# Patient Record
Sex: MALE | Race: ASIAN | URBAN - METROPOLITAN AREA
[De-identification: names, ages, dates, MRNs, and addresses within clinical notes are randomized per-mention and may not be internally consistent; named-entity substitution may affect disease eponyms.]

---

## 2017-01-01 ENCOUNTER — CARE COORDINATION (OUTPATIENT)
Dept: GERIATRIC MEDICINE | Facility: CLINIC | Age: 82
End: 2017-01-01

## 2017-01-01 ENCOUNTER — TELEPHONE (OUTPATIENT)
Dept: FAMILY MEDICINE | Facility: CLINIC | Age: 82
End: 2017-01-01

## 2017-01-01 ENCOUNTER — TELEPHONE (OUTPATIENT)
Dept: NURSING | Facility: CLINIC | Age: 82
End: 2017-01-01

## 2017-01-01 ENCOUNTER — OFFICE VISIT (OUTPATIENT)
Dept: FAMILY MEDICINE | Facility: CLINIC | Age: 82
End: 2017-01-01
Payer: COMMERCIAL

## 2017-01-01 ENCOUNTER — TRANSFERRED RECORDS (OUTPATIENT)
Dept: HEALTH INFORMATION MANAGEMENT | Facility: CLINIC | Age: 82
End: 2017-01-01

## 2017-01-01 ENCOUNTER — CARE COORDINATION (OUTPATIENT)
Dept: CASE MANAGEMENT | Facility: CLINIC | Age: 82
End: 2017-01-01

## 2017-01-01 VITALS
OXYGEN SATURATION: 100 % | BODY MASS INDEX: 22.52 KG/M2 | DIASTOLIC BLOOD PRESSURE: 72 MMHG | WEIGHT: 131.9 LBS | SYSTOLIC BLOOD PRESSURE: 136 MMHG | HEIGHT: 64 IN | TEMPERATURE: 98 F | HEART RATE: 90 BPM | RESPIRATION RATE: 16 BRPM

## 2017-01-01 DIAGNOSIS — Z91.09 ENVIRONMENTAL ALLERGIES: ICD-10-CM

## 2017-01-01 DIAGNOSIS — F33.0 MAJOR DEPRESSIVE DISORDER, RECURRENT EPISODE, MILD (H): ICD-10-CM

## 2017-01-01 DIAGNOSIS — K59.00 CONSTIPATION, UNSPECIFIED CONSTIPATION TYPE: ICD-10-CM

## 2017-01-01 DIAGNOSIS — Z53.9 DIAGNOSIS NOT YET DEFINED: Primary | ICD-10-CM

## 2017-01-01 DIAGNOSIS — H26.9 CATARACT: ICD-10-CM

## 2017-01-01 DIAGNOSIS — F51.01 PRIMARY INSOMNIA: ICD-10-CM

## 2017-01-01 DIAGNOSIS — N04.0 NEPHROTIC SYNDROME WITH LESION OF MINIMAL CHANGE GLOMERULONEPHRITIS: Primary | ICD-10-CM

## 2017-01-01 DIAGNOSIS — R63.0 ANOREXIA: ICD-10-CM

## 2017-01-01 DIAGNOSIS — E03.9 ACQUIRED HYPOTHYROIDISM: ICD-10-CM

## 2017-01-01 DIAGNOSIS — I10 ESSENTIAL HYPERTENSION WITH GOAL BLOOD PRESSURE LESS THAN 130/80: ICD-10-CM

## 2017-01-01 DIAGNOSIS — N18.4 CKD (CHRONIC KIDNEY DISEASE) STAGE 4, GFR 15-29 ML/MIN (H): Primary | ICD-10-CM

## 2017-01-01 DIAGNOSIS — R63.4 LOSS OF WEIGHT: ICD-10-CM

## 2017-01-01 DIAGNOSIS — M54.50 MIDLINE LOW BACK PAIN WITHOUT SCIATICA, UNSPECIFIED CHRONICITY: ICD-10-CM

## 2017-01-01 DIAGNOSIS — R73.09 ELEVATED GLUCOSE: Primary | ICD-10-CM

## 2017-01-01 DIAGNOSIS — L20.9 ATOPIC DERMATITIS, UNSPECIFIED TYPE: ICD-10-CM

## 2017-01-01 DIAGNOSIS — K21.9 GASTROESOPHAGEAL REFLUX DISEASE, ESOPHAGITIS PRESENCE NOT SPECIFIED: ICD-10-CM

## 2017-01-01 LAB — EJECTION FRACTION: 60

## 2017-01-01 PROCEDURE — G0179 MD RECERTIFICATION HHA PT: HCPCS | Performed by: FAMILY MEDICINE

## 2017-01-01 PROCEDURE — 99214 OFFICE O/P EST MOD 30 MIN: CPT | Performed by: FAMILY MEDICINE

## 2017-01-01 PROCEDURE — 99207 ZZC NO BILLABLE SERVICE THIS VISIT: CPT | Performed by: FAMILY MEDICINE

## 2017-01-01 RX ORDER — METOPROLOL TARTRATE 50 MG
TABLET ORAL
Qty: 90 TABLET | Refills: 3 | OUTPATIENT
Start: 2017-01-01

## 2017-01-01 RX ORDER — ACETAMINOPHEN AND CODEINE PHOSPHATE 300; 30 MG/1; MG/1
1 TABLET ORAL 3 TIMES DAILY PRN
Qty: 90 TABLET | Refills: 2 | Status: SHIPPED | OUTPATIENT
Start: 2017-01-01

## 2017-01-01 RX ORDER — QUETIAPINE FUMARATE 25 MG/1
TABLET, FILM COATED ORAL
Qty: 30 TABLET | Refills: 0 | Status: SHIPPED | OUTPATIENT
Start: 2017-01-01 | End: 2017-01-01

## 2017-01-01 RX ORDER — METOPROLOL TARTRATE 50 MG
TABLET ORAL
Qty: 90 TABLET | Refills: 0 | Status: SHIPPED | OUTPATIENT
Start: 2017-01-01

## 2017-01-01 RX ORDER — LACTOSE-REDUCED FOOD
LIQUID (ML) ORAL
Qty: 90 EACH | Refills: 11 | Status: SHIPPED
Start: 2017-01-01 | End: 2017-01-01

## 2017-01-01 RX ORDER — CHOLECALCIFEROL (VITAMIN D3) 25 MCG
TABLET ORAL
Qty: 100 TABLET | Refills: 3 | Status: SHIPPED | OUTPATIENT
Start: 2017-01-01

## 2017-01-01 RX ORDER — OMEPRAZOLE 40 MG/1
CAPSULE, DELAYED RELEASE ORAL
Qty: 90 CAPSULE | Refills: 3 | Status: SHIPPED | OUTPATIENT
Start: 2017-01-01

## 2017-01-01 RX ORDER — LEVOTHYROXINE SODIUM 88 UG/1
TABLET ORAL
Qty: 90 TABLET | Refills: 0 | Status: SHIPPED | OUTPATIENT
Start: 2017-01-01 | End: 2017-01-01

## 2017-01-01 RX ORDER — QUETIAPINE FUMARATE 25 MG/1
TABLET, FILM COATED ORAL
Qty: 30 TABLET | Refills: 2 | Status: SHIPPED | OUTPATIENT
Start: 2017-01-01

## 2017-01-01 RX ORDER — ASPIRIN 81 MG
TABLET, DELAYED RELEASE (ENTERIC COATED) ORAL
Qty: 100 TABLET | Refills: 2 | Status: SHIPPED | OUTPATIENT
Start: 2017-01-01

## 2017-01-01 RX ORDER — THIAMINE MONONITRATE (VIT B1) 100 MG
TABLET ORAL
Qty: 90 TABLET | Refills: 3 | Status: SHIPPED | OUTPATIENT
Start: 2017-01-01

## 2017-01-01 RX ORDER — LACTOSE-REDUCED FOOD
LIQUID (ML) ORAL
Qty: 90 EACH | Refills: 11 | Status: SHIPPED | OUTPATIENT
Start: 2017-01-01

## 2017-01-01 RX ORDER — LEVOTHYROXINE SODIUM 88 UG/1
88 TABLET ORAL DAILY
Qty: 90 TABLET | Refills: 1 | Status: SHIPPED | OUTPATIENT
Start: 2017-01-01

## 2017-01-01 RX ORDER — FOLIC ACID 1 MG/1
TABLET ORAL
Qty: 90 TABLET | Refills: 2 | Status: SHIPPED | OUTPATIENT
Start: 2017-01-01

## 2017-01-01 RX ORDER — SENNA-LAX 8.6 MG/1
TABLET ORAL
Qty: 360 TABLET | Refills: 3 | Status: SHIPPED | OUTPATIENT
Start: 2017-01-01

## 2017-01-01 RX ORDER — LACTOSE-REDUCED FOOD
LIQUID (ML) ORAL
Qty: 90 EACH | Refills: 11 | Status: SHIPPED | OUTPATIENT
Start: 2017-01-01 | End: 2017-01-01

## 2017-01-01 RX ORDER — CYPROHEPTADINE HYDROCHLORIDE 4 MG/1
4 TABLET ORAL 2 TIMES DAILY
Qty: 60 TABLET | Refills: 5 | Status: SHIPPED | OUTPATIENT
Start: 2017-01-01

## 2017-01-01 RX ORDER — LORATADINE 10 MG/1
TABLET ORAL
Qty: 90 TABLET | Refills: 3 | Status: SHIPPED | OUTPATIENT
Start: 2017-01-01

## 2017-01-01 RX ORDER — FLUOXETINE 40 MG/1
CAPSULE ORAL
Qty: 90 CAPSULE | Refills: 3 | Status: SHIPPED | OUTPATIENT
Start: 2017-01-01

## 2017-01-01 RX ORDER — TRIAMCINOLONE ACETONIDE 0.25 MG/ML
LOTION TOPICAL
Qty: 120 ML | Refills: 5 | Status: SHIPPED | OUTPATIENT
Start: 2017-01-01

## 2017-01-01 ASSESSMENT — ANXIETY QUESTIONNAIRES
GAD7 TOTAL SCORE: 4
5. BEING SO RESTLESS THAT IT IS HARD TO SIT STILL: NOT AT ALL
GAD7 TOTAL SCORE: 4
IF YOU CHECKED OFF ANY PROBLEMS ON THIS QUESTIONNAIRE, HOW DIFFICULT HAVE THESE PROBLEMS MADE IT FOR YOU TO DO YOUR WORK, TAKE CARE OF THINGS AT HOME, OR GET ALONG WITH OTHER PEOPLE: SOMEWHAT DIFFICULT
3. WORRYING TOO MUCH ABOUT DIFFERENT THINGS: SEVERAL DAYS
1. FEELING NERVOUS, ANXIOUS, OR ON EDGE: SEVERAL DAYS
6. BECOMING EASILY ANNOYED OR IRRITABLE: SEVERAL DAYS
2. NOT BEING ABLE TO STOP OR CONTROL WORRYING: SEVERAL DAYS
7. FEELING AFRAID AS IF SOMETHING AWFUL MIGHT HAPPEN: NOT AT ALL

## 2017-01-01 ASSESSMENT — PATIENT HEALTH QUESTIONNAIRE - PHQ9
5. POOR APPETITE OR OVEREATING: NOT AT ALL
SUM OF ALL RESPONSES TO PHQ QUESTIONS 1-9: 11

## 2017-01-01 ASSESSMENT — PAIN SCALES - GENERAL: PAINLEVEL: MILD PAIN (2)

## 2017-01-16 NOTE — TELEPHONE ENCOUNTER
order for DME one touch test strips      Last Written Prescription Date: 8/19/16  Last Fill Quantity: 100, # refills: 3  Last Office Visit with G, P or [facility] prescribing provider: 12/27/16       ALT       26   7/14/2015  CREATININE   Date Value Ref Range Status   09/24/2015 2.03 mg/dL Final     WBC      8.2   12/17/2009  RBC     4.69   12/17/2009  HGB     11.6   7/3/2014  HCT     38.5   12/17/2009  No components found with this name: mct  MCV       82   12/17/2009  MCH     27.4   12/17/2009  MCHC     33.4   12/17/2009  RDW     14.3   12/17/2009  PLT      299   12/17/2009=

## 2017-01-17 NOTE — TELEPHONE ENCOUNTER
Prescription approved per [facility] Refill Protocol.  Ling Nicolas RN

## 2017-01-25 NOTE — TELEPHONE ENCOUNTER
aspirin 81 MG tablet      Last Written Prescription Date: 1/13/16  Last Quantity: 100, # refills: 3  Last Office Visit with [facility], [facility] or  Health prescribing provider: 1/2/17       CREATININE   Date Value Ref Range Status   09/24/2015 2.03 mg/dL Final     AST       19   7/14/2015  ALT       26   7/14/2015  BP Readings from Last 3 Encounters:   12/27/16 126/82   11/11/16 124/72   09/30/16 112/72       calcium carbonate-vitamin D 600-200 MG-UNIT TABS      Last Written Prescription Date: 1/13/16  Last Fill Quantity: 200,  # refills: 3   Last Office Visit with [facility], [facility] or [facility] prescribing provider: 1/2/17                                             FLUoxetine (PROZAC) 40 MG capsule     Last Written Prescription Date: 1/13/16  Last Fill Quantity: 90, # refills: 3  Last Office Visit with [facility] primary care provider:  1/2/17        Last PHQ-9 score on record=   PHQ-9 SCORE 8/31/2016   Total Score -   Total Score 7         folic acid (FOLVITE) 1 MG tablet      Last Written Prescription Date: 1/13/16  Last Fill Quantity: 90,  # refills: 3   Last Office Visit with [facility], [facility] or [facility] prescribing provider: 1/2/17                                               levothyroxine (SYNTHROID, LEVOTHROID) 88 MCG tablet     Last Written Prescription Date: 1/13/16  Last Quantity: 90, # refills: 3  Last Office Visit with [facility] or [facility] prescribing provider: 1/2/17        TSH   Date Value Ref Range Status   03/10/2015 0.68 0.40 - 4.00 mU/L Final     Comment:     Effective 7/30/2014, the reference range for this assay has changed to reflect   new instrumentation/methodology.             thiamine 100 MG tablet      Last Written Prescription Date: 1/13/16  Last Fill Quantity: 90,  # refills: 3   Last Office Visit with [facility], [facility] or [facility] prescribing provider: 1/13/16

## 2017-01-26 NOTE — TELEPHONE ENCOUNTER
Aspirin, Calcium-Carbonate and Folic Acid:  Prescription approved per [facility] Refill Protocol.    Vitamin D3 - Routing refill request to provider for review/approval because:  Drug not active on patient's medication list    Thiamine - Routing refill request to provider for review/approval because:  Drug not on the [facility] refill protocol     Levothyroxine - Routing refill request to provider for review/approval because:  Labs not current:  Last TSH was 2015    Prozac:  Routing refill request to provider for review/approval because:  Labs out of range:  Last PHQ9 > 4    Umm Baez RN

## 2017-02-23 NOTE — TELEPHONE ENCOUNTER
Call back to Clare with [facility] in [facility] [phone].  Calling to check on status of PA as patient is out of her medication.

## 2017-02-23 NOTE — TELEPHONE ENCOUNTER
Don't see that PA was ever sent. Tried to send PA for Megestrol through [employer]Meds but it would not allow. Called insurance awaiting form. Informed Clare    PT Id [id number]    Ins # [phone]      Case ID [id number]

## 2017-02-26 NOTE — TELEPHONE ENCOUNTER
Call Type: Triage Call    Presenting Problem: Radha, from [employer], calls to report  that patient sent a prescription for Megace, but this will require  that a prior authorization request to be submitted. Radha will fax  this request to the prescribing provider.  Triage Note:  Guideline Title: Medication Questions - Adult  Recommended Disposition: Provide Health Information  Original Inclination:  Override Disposition:  Intended Action:  Physician Contacted: No  Pharmacy calling with prescription question; answered per department policy. ?  YES  Pregnant and has medication questions regarding prescribed and/or nonprescribed  medication(s) not covered by available resources ? NO  Breastfeeding and has medication questions regarding prescribed and/or  nonprescribed medication(s) not covered by available resources ? NO  Requested information on how to safely dispose of  or unused medications ?  NO  Sign(s) or symptom(s) associated with a diagnosed condition or with a new illness  ? NO  Prescription ordered today and not available at pharmacy putting patient at  clinical risk ? NO  Recurrence of a symptom(s) or illness post prescribed medication treatment AND  provider instructed patient to call if symptom(s) returned. ? NO  Unable to obtain prescribed medication related to available resources AND  situation poses immediate clinical risk ? NO  Pharmacy calling to clarify prescription order. ? NO  Requests refill of prescribed medication that does NOT have a valid refill; lack  of medication may cause clinical risk to patient if not available. ? NO  Has questions about prescribed and/or nonprescribed medications not covered by  available resources ? NO  Requests refill of prescribed medication without valid refills OR requests refill  of prescribed medication with valid refills but does not have prescription number  (no RX container); lack of medication does not put patient at clinical risk ?  NO  Physician Instructions:  Care Advice:

## 2017-02-27 NOTE — TELEPHONE ENCOUNTER
Faxed PA form - placed in PA folder until we receive response from insurance    Will Kimberly CARDOZO

## 2017-03-07 NOTE — TELEPHONE ENCOUNTER
[facility] forms placed on Dr. Grullon deslesia for completion.    Zita MCKINLEY, Patient Care

## 2017-03-21 NOTE — MR AVS SNAPSHOT
After Visit Summary   3/21/2017    Cem Raymond    MRN: [id number]           Patient Information     Date Of Birth          1935        Visit Information        Provider Department      3/21/2017 2:45 PM Mila Adler MD; [employer] [facility]        Today's Diagnoses     CKD (chronic kidney disease) stage 4, GFR 15-29 ml/min (H)    -  1    Acquired hypothyroidism        Loss of weight        Anorexia        Midline low back pain without sciatica, unspecified chronicity        Cataract        Environmental allergies        Atopic dermatitis, unspecified type           Follow-ups after your visit        Additional Services     OPHTHALMOLOGY ADULT REFERRAL       Your provider has referred you to: patient choice     Please be aware that coverage of these services is subject to the terms and limitations of your health insurance plan.  Call member services at your health plan with any benefit or coverage questions.      Please bring the following with you to your appointment:    (1) Any X-Rays, CTs or MRIs which have been performed.  Contact the facility where they were done to arrange for  prior to your scheduled appointment.    (2) List of current medications  (3) This referral request   (4) Any documents/labs given to you for this referral                  Follow-up notes from your care team     Return in about 3 months (around 6/21/2017).      Your next 10 appointments already scheduled     Apr 17, 2017  2:00 PM CDT   New Visit with Jason Humphrey MD, MG OPH NURSE ONLY   [facility]    [id number] [place] [id number] [phone]              Who to contact     If you have questions or need follow up information about today's clinic visit or your schedule please contact [facility] directly at [phone].  Normal or non-critical lab and imaging  "results will be communicated to you by MyChart, letter or phone within 4 business days after the clinic has received the results. If you do not hear from us within 7 days, please contact the clinic through [employer] or phone. If you have a critical or abnormal lab result, we will notify you by phone as soon as possible.  Submit refill requests through [employer] or call your pharmacy and they will forward the refill request to us. Please allow 3 business days for your refill to be completed.          Additional Information About Your Visit        [employer] Information     [employer] lets you send messages to your doctor, view your test results, renew your prescriptions, schedule appointments and more. To sign up, go to [email or web address] . Click on \"Log in\" on the left side of the screen, which will take you to the Welcome page. Then click on \"Sign up Now\" on the right side of the page.     You will be asked to enter the access code listed below, as well as some personal information. Please follow the directions to create your username and password.     Your access code is: [email or web address]  Expires: 2017  4:09 PM     Your access code will  in 90 days. If you need help or a new code, please call your [facility] clinic or [phone].        Care EveryWhere ID     This is your Care EveryWhere ID. This could be used by other organizations to access your [facility] medical records  [id number]        Your Vitals Were     Pulse Temperature Respirations Height Pulse Oximetry BMI (Body Mass Index)    90 98  F (36.7  C) (Oral) 16 1.613 m (5' 3.5\") 100% 23 kg/m2       Blood Pressure from Last 3 Encounters:   17 136/72   16 126/82   16 124/72    Weight from Last 3 Encounters:   17 59.8 kg (131 lb 14.4 oz)   16 64.8 kg (142 lb 14.4 oz)   16 63 kg (139 lb)              We Performed the Following     OPHTHALMOLOGY ADULT REFERRAL          Today's Medication Changes          These changes are " accurate as of: 3/21/17  4:09 PM.  If you have any questions, ask your nurse or doctor.               Start taking these medicines.        Dose/Directions    acetaminophen-codeine 300-30 MG per tablet   Commonly known as:  TYLENOL/codeine #3   Used for:  Midline low back pain without sciatica, unspecified chronicity   Started by:  Mila Adler MD        Dose:  1 tablet   Take 1 tablet by mouth 3 times daily as needed for pain   Quantity:  90 tablet   Refills:  2       cyproheptadine 4 MG tablet   Commonly known as:  PERIACTIN   Used for:  Loss of weight, Anorexia   Started by:  Mila Adler MD        Dose:  4 mg   Take 1 tablet (4 mg) by mouth 2 times daily To stimulate appetite   Quantity:  60 tablet   Refills:  5       triamcinolone acetonide 0.025 % Lotn   Used for:  Atopic dermatitis, unspecified type   Started by:  Mila Adler MD        Apply 3 times daily as needed   Quantity:  120 mL   Refills:  5         These medicines have changed or have updated prescriptions.        Dose/Directions    levothyroxine 88 MCG tablet   Commonly known as:  SYNTHROID/LEVOTHROID   This may have changed:  See the new instructions.   Used for:  Acquired hypothyroidism   Changed by:  Mila Adler MD        Dose:  88 mcg   Take 1 tablet (88 mcg) by mouth daily   Quantity:  90 tablet   Refills:  1       loratadine 10 MG tablet   Commonly known as:  CLARITIN   This may have changed:  See the new instructions.   Used for:  Environmental allergies   Changed by:  Mila Adler MD        TAKE 1 TABLET BY MOUTH EVERY DAY AS NEEDED FOR ALLERGIES   Quantity:  90 tablet   Refills:  3       NEPRO/CARBSTEADY Liqd   This may have changed:  additional instructions   Used for:  Anorexia, Loss of weight   Changed by:  Mila Adler MD        1 can 3x daily   Quantity:  90 each   Refills:  11         Stop taking these medicines if you haven't already. Please contact your care team if  you have questions.     HYDROcodone-acetaminophen 7.5-325 MG per tablet   Commonly known as:  NORCO   Stopped by:  Mila Adler MD                Where to get your medicines      These medications were sent to [facility] - [place], [place] - [address]  [address], [place] [address]     Phone:  [phone]     cyproheptadine 4 MG tablet    levothyroxine 88 MCG tablet    loratadine 10 MG tablet    NEPRO/CARBSTEADY Liqd    triamcinolone acetonide 0.025 % Lotn         Some of these will need a paper prescription and others can be bought over the counter.  Ask your nurse if you have questions.     Bring a paper prescription for each of these medications     acetaminophen-codeine 300-30 MG per tablet                Primary Care Provider Office Phone # Fax #    Mila Adler [phone]       [address] [address]        Thank you!     Thank you for choosing [facility]  for your care. Our goal is always to provide you with excellent care. Hearing back from our patients is one way we can continue to improve our services. Please take a few minutes to complete the written survey that you may receive in the mail after your visit with us. Thank you!             Your Updated Medication List - Protect others around you: Learn how to safely use, store and throw away your medicines at www.disposemymeds.org.          This list is accurate as of: 3/21/17  4:09 PM.  Always use your most recent med list.                   Brand Name Dispense Instructions for use    acetaminophen 325 MG tablet    TYLENOL    100 tablet    Take 1-2 tablets (325-650 mg) by mouth 4 times daily as needed for pain 2 tablets as needed for pain       acetaminophen-codeine 300-30 MG per tablet    TYLENOL/codeine #3    90 tablet    Take 1 tablet by mouth 3 times daily as needed for pain        allopurinol 100 MG tablet    ZYLOPRIM    60 tablet    TAKE 2 TABLETS BY MOUTH DAILY.       * aspirin 81 MG tablet     100 tablet    Take 1 tablet (81 mg) by mouth daily *       * ASPIRIN LOW DOSE 81 MG EC tablet   Generic drug:  aspirin     100 tablet    TAKE 1 TABLET BY MOUTH EVERY DAY       Calcium + D3 600-200 MG-UNIT Tabs     200 tablet    TAKE 1 TABLET BY MOUTH TWICE DAILY       calcium carbonate-vitamin D 600-200 MG-UNIT Tabs     200 tablet    Take 1 tablet by mouth 2 times daily       cholecalciferol 1000 UNIT tablet    vitamin D    100 tablet    TAKE ONE TABLET BY MOUTH ONCE DAILY       cyproheptadine 4 MG tablet    PERIACTIN    60 tablet    Take 1 tablet (4 mg) by mouth 2 times daily To stimulate appetite       diphenhydrAMINE 25 MG capsule    BENADRYL    60 capsule    1-2  CAPSULES AT BEDTIME AS NEEDED FOR ITCHING       EASY TOUCH LANCETS 33G/TWIST Misc     100 each    USE TO TEST BLOOD SUGAR DAILY. [DAY SUPPLY=100]       fluocinonide 0.05 % ointment    LIDEX    120 g    Apply topically 2 times daily as needed       FLUoxetine 40 MG capsule    PROzac    90 capsule    TAKE 1 CAPSULE BY MOUTH EVERY DAY       folic acid 1 MG tablet    FOLVITE    90 tablet    TAKE 1 TABLET BY MOUTH EVERY DAY       hydrocortisone 1 % ointment     30 g    Apply sparingly to affected area three times daily for 14 days.       hydrOXYzine 25 MG tablet    ATARAX    90 tablet    Take 1 tablet (25 mg) by mouth 3 times daily as needed for itching       levothyroxine 88 MCG tablet    SYNTHROID/LEVOTHROID    90 tablet    Take 1 tablet (88 mcg) by mouth daily       loratadine 10 MG tablet    CLARITIN    90 tablet    TAKE 1 TABLET BY MOUTH EVERY DAY AS NEEDED FOR ALLERGIES       megestrol 40 MG/ML suspension    MEGACE    150 mL    Take 1 mL (40 mg) by mouth daily       metoprolol 50 MG tablet    LOPRESSOR    90 tablet    TAKE 1/2 TABLET BY MOUTH TWICE DAILY.       NEPRO/CARBSTEADY Liqd     90 each    1 can 3x daily       omeprazole 40 MG  capsule    priLOSEC    90 capsule    Take 1 capsule (40 mg) by mouth daily Take 30-60 minutes before a meal.       ONE TOUCH ULTRA test strip   Generic drug:  blood glucose monitoring     100 each    USE TO TEST BLOOD SUGAR DAILY.       * order for DME     1 Device    Equipment being ordered: Wheelchair - electric - purchase       * order for DME     30 Can    Equipment being ordered: Ensure - 1 can daily       * order for DME     100 strip    Test strips and lancets - accucheck compact. Tests daily .       * order for DME     60 Can    Equipment being ordered: Glucerna twice daily       QUEtiapine 25 MG tablet    SEROquel    30 tablet    TAKE 1 TABLET BY MOUTH AT BEDTIME.       sennosides 8.6 MG tablet    SENOKOT    360 tablet    Take  by mouth. 4 TABLETS AT BEDTIME       triamcinolone acetonide 0.025 % Lotn     120 mL    Apply 3 times daily as needed       Vitamin B-1 100 MG Tabs     90 tablet    TAKE 1 TABLET BY MOUTH EVERY DAY       * Notice:  This list has 6 medication(s) that are the same as other medications prescribed for you. Read the directions carefully, and ask your doctor or other care provider to review them with you.

## 2017-03-21 NOTE — NURSING NOTE
"Chief Complaint   Patient presents with     Recheck Medication     Back Pain       Initial /72 (BP Location: Right arm, Patient Position: Right side, Cuff Size: Adult Regular)  Pulse 90  Temp 98  F (36.7  C) (Oral)  Resp 16  Ht 1.613 m (5' 3.5\")  Wt 59.8 kg (131 lb 14.4 oz)  SpO2 100%  BMI 23 kg/m2 Estimated body mass index is 23 kg/(m^2) as calculated from the following:    Height as of this encounter: 1.613 m (5' 3.5\").    Weight as of this encounter: 59.8 kg (131 lb 14.4 oz).  Medication Reconciliation: complete     Will Kimberly CARDOZO      "

## 2017-03-21 NOTE — PROGRESS NOTES
SUBJECTIVE:                                                    Cem Raymond is a 81 year old male who presents to clinic today for the following health issues:      Hypertension Follow-up      Outpatient blood pressures are being checked at home.  Results are good.    Low Salt Diet: low salt     Depression Followup    Status since last visit: Improved     See PHQ-9 for current symptoms.  Other associated symptoms: None    Complicating factors:   Significant life event:  No   Current substance abuse:  None  Anxiety or Panic symptoms:  No    PHQ-9  English PHQ-9   Any Language          Hypothyroidism Follow-up      Since last visit, patient describes the following symptoms: has lost some weight     SUBJECTIVE:  Here today in follow-up of multiple issues. Well-known to me but has been struggling a lot the last couple of years. Difficult to pin down if this is related to age, chronic illness, recent injuries but he has had a very poor appetite and lost significant weight. We had him on Megace for a while and this did seem to increase appetite and weight but it is no longer covered by insurance. Deals with significant low back pain and had been doing well with Tylenol plus codeine for a while. Following hospitalization a few months ago pain had increased and we tried hydrocodone but this made him very tired and very constipated. Has a follow-up with Dr. Aguilar his kidney doctor in the next few weeks. He is due to have a thyroid recheck and he thinks he can get that done at the same time. Has a history of cataracts. Vision has been failing would like to get back in to see ophthalmology. The patient does not offhand remember the ophthalmologist's name but has the contact information at home.    Review of systems otherwise negative.  Past medical, family, and social history reviewed and updated in chart.    OBJECTIVE:  /72 (BP Location: Right arm, Patient Position: Right side, Cuff Size: Adult Regular)   "Pulse 90  Temp 98  F (36.7  C) (Oral)  Resp 16  Ht 1.613 m (5' 3.5\")  Wt 59.8 kg (131 lb 14.4 oz)  SpO2 100%  BMI 23 kg/m2  Alert and pleasant but definitely appears a little bit underweight from when I saw him last  S1 and S2 normal, no murmurs, clicks, gallops or rubs. Regular rate and rhythm. Chest is clear; no wheezes or rales. No edema or JVD.   Past labs reviewed with the patient.     ASSESSMENT / PLAN:  (N18.4) CKD (chronic kidney disease) stage 4, GFR 15-29 ml/min (H)  (primary encounter diagnosis)  Comment: Continue to follow with his renal specialist. We need to be careful with medications and his overall status  Plan:     (E03.9) Acquired hypothyroidism  Comment: Please have TSH done with his upcoming labs  Plan: levothyroxine (SYNTHROID/LEVOTHROID) 88 MCG         tablet            (R63.4) Loss of weight  Comment: We will try increasing to 3 cans of nutritional supplement and a trial of cyproheptadine. Discussed mechanism of action of the proposed medication, as well as potential effects, both good and bad.  Patient expressed understanding and agreed with treatment.   Plan: cyproheptadine (PERIACTIN) 4 MG tablet,         Nutritional Supplements (NEPRO/CARBSTEADY) LIQD            (R63.0) Anorexia  Comment: As above  Plan: cyproheptadine (PERIACTIN) 4 MG tablet,         Nutritional Supplements (NEPRO/CARBSTEADY) LIQD            (M54.5) Midline low back pain without sciatica, unspecified chronicity  Comment: Discussed mechanism of action of the proposed medication, as well as potential effects, both good and bad.  Patient expressed understanding and agreed with treatment.   Plan: acetaminophen-codeine (TYLENOL/CODEINE #3)         300-30 MG per tablet            (H26.9) Cataract  Comment:   Plan: OPHTHALMOLOGY ADULT REFERRAL            (Z91.09) Environmental allergies  Comment:   Plan: loratadine (CLARITIN) 10 MG tablet            (L20.9) Atopic dermatitis, unspecified type  Comment:   Plan: triamcinolone " acetonide 0.025 % LOTN            Follow up 2-3 months  SHERLEY Adler MD    (Chart documentation completed in part with Dragon voice-recognition software.  Even though reviewed some grammatical, spelling, and word errors may remain.)

## 2017-03-22 NOTE — TELEPHONE ENCOUNTER
Left message informing pt medication sent to [facility] instead    Will Kimberly CARDOZO

## 2017-03-22 NOTE — TELEPHONE ENCOUNTER
I don't know of an alternative - does he have one to suggest?  Nutritional supplement for a patient with advanced kidney disease  Might need to try a different pharmacy?

## 2017-03-22 NOTE — TELEPHONE ENCOUNTER
rx faxed to [facility]  as directed by Dr. Adler on sticky note and placed in red folder    Rozina Snell MA

## 2017-03-22 NOTE — TELEPHONE ENCOUNTER
Sandy with [facility] reporting pt BP has been high the last 3 visits 164/98 and 156/88. It was even higher last week but she does not have the numbers.  Please call back to advise  Thanks.    What is the best number to contact you? Cell [phone]  What time works best to contact you? Anytime. [facility]

## 2017-03-22 NOTE — TELEPHONE ENCOUNTER
Ramos, [facility], [phone] called regarding NEPRO/CARBSTEADY) LIQD.    They do not carry Nepro.    Please call to advise.  Ok to leave message.    Thank you,    Ally Hicks

## 2017-03-22 NOTE — TELEPHONE ENCOUNTER
Called pharmacy -     This kind of product is not carried at this pharmacy (any of the oral nutrition stuff - ensure, etc)    Pharmacist states major pharmacies carry these -    Will Kimberly CARDOZO

## 2017-03-23 NOTE — TELEPHONE ENCOUNTER
Called number in chart, unsure where to find number for Sandy as noted below. No number given. Family member who answered phone was unable to give me info.      Nixon Hawkins CMA

## 2017-04-20 NOTE — TELEPHONE ENCOUNTER
sennosides (SENOKOT) 8.6 MG tablet      Last Written Prescription Date: 4/26/16  Last Fill Quantity: 360,  # refills: 3   Last Office Visit with G, P or [facility] prescribing provider: 3/21/17 Dr. Vitor Alan

## 2017-04-24 NOTE — TELEPHONE ENCOUNTER
Prescription approved per [facility] Refill Protocol.  Ling Nicolas RN

## 2017-05-05 NOTE — TELEPHONE ENCOUNTER
QUEtiapine (SEROQUEL) 25 MG tablet     Last Written Prescription Date: 11/15/16  Last Fill Quantity: 30, # refills: 5  Last Office Visit with G, [facility] or [facility] prescribing provider: 3/21/17       BP Readings from Last 3 Encounters:   03/21/17 136/72   12/27/16 126/82   11/11/16 124/72     Pulse Readings from Last 2 Encounters:   03/21/17 90   12/27/16 104     Lab Results   Component Value Date     07/14/2015     Lab Results   Component Value Date    WBC 8.2 12/17/2009     Lab Results   Component Value Date    RBC 4.69 12/17/2009     Lab Results   Component Value Date    HGB 11.6 07/03/2014     Lab Results   Component Value Date    HCT 38.5 12/17/2009     No components found for: MCT  Lab Results   Component Value Date    MCV 82 12/17/2009     Lab Results   Component Value Date    MCH 27.4 12/17/2009     Lab Results   Component Value Date    MCHC 33.4 12/17/2009     Lab Results   Component Value Date    RDW 14.3 12/17/2009     Lab Results   Component Value Date     12/17/2009     Lab Results   Component Value Date    CHOL 279 09/24/2015     Lab Results   Component Value Date    HDL 37 07/03/2014     Lab Results   Component Value Date     07/03/2014     Lab Results   Component Value Date    TRIG 160 07/03/2014     Lab Results   Component Value Date    CHOLHDLRATIO 4.6 07/03/2014

## 2017-05-05 NOTE — PROGRESS NOTES
[facility].  Client is not gaining weight but reports a poor appetite.  Has Stage 4 kidney disease which nurse said may be cause of poor appetite.  Client can walk with assist of walker for about 200 ft.  Plan is to keep him one more week to work on endurance when walking before pain prevents from continuing.  Is now taking two Norco before each therapy which client reports helping.      Will get [facility] to do skilled visits weekly.  Requesting a lift chair.      Amanda Coates RN  [facility]   [phone]

## 2017-05-08 NOTE — PROGRESS NOTES
"5/4/17 care conference at [facility].  Client agreed to be there on more week but said many times that he wanted to go home.  Goal is to increase strength so client can ambulate longer before pain ends his walks.  CM shared that client is in bed most of the day every day when at home.  Client was in bed for this care conference as well.  Is requesting a lift chair.  Client's niece and PCA \"Angel Luis\" was there who confirmed this.    Pain medication was started twice a day which client said was helpful but still he is resistant to walk more that 200 ft and for now, that is with staff at [facility].    Client also has Stage 4 Kidney disease which the nurse thought may be contributing to client's lack of appetite.  He is currently eating about 50% of his meals.     will inform this CM with discharge date to home.    CM requested that the lift chair process start there with a doctor's order and letter of medical necessity.      Amanda Coates RN  [facility]   [phone]  "

## 2017-05-09 NOTE — TELEPHONE ENCOUNTER
Medication is being filled for 1 time refill only due to:  Patient needs to be seen because due for a visit..   Ling Nicolas RN

## 2017-05-10 NOTE — PROGRESS NOTES
"CM call to Robyn in the TCU who said there is no discharge date yet.  Client had a fall on 5/7/17 and went down to the ED and then returned to the TCU.  He is very much wanting to go home but his safety needs to be considered. He has stageIV kidney disease which compromised his strength and being home alone is questionable.  Currently is getting 4 hours a day of PCA and another assessment can be done to increase it but he will still be alone for a portion of every day which needs to be evaluated.    His PCA is his niece \"Angel Luis\" who is very devoted to client but has two young children and cannot be there all the time.  Client is very clear that he wants to go home.      Amanda Coates RN  [facility]   [phone]  "

## 2017-05-11 NOTE — PROGRESS NOTES
Call from DANIEL at [facility] TCU where client has been since 4/25/17.  Yesterday he was transferred to [facility] due to decreased kidney function.  Plan is to discuss dialysis or if client is not agreeable to that, possibly hospice.  He has been increasingly more weak and going home now is not an option.  Increased nausea and vomiting.  Family has chosen to take all of his belongings home and not to hold the bed.      Amanda Coates RN  [facility]   [phone]

## 2017-05-15 NOTE — PROGRESS NOTES
Client was transferred from [facility] TCU to [facility] ICU.  Had another fall again 5/14/17 due to weakness.  Plan is to have PT/OT evaluations today and to start dialysis today.  They will do it every day this week.  Client has been refusing meds so Tammy SANCHEZ is not confident that client was comply with the dialysis.  If he does refuse, then hospice eval will be ordered.  DANIEL is thinking that if he is there all week, will then possibly go to a TCU and then long term care.  He is too weak to be alone and the weakness will only be worse with the dialysis.  Appetite is very poor so hoping dialysis will help with appetite.  DANIEL asked that CM check in toward the end of the week.      Amanda Coates RN  [facility]   [phone]

## 2017-05-19 NOTE — PROGRESS NOTES
"Again call to Tammy, the DANIEL at [facility] where client has been for lst week.  She shared that client did start dialysis which did not improve his nausea and poor appetite.  He does have cirrohsis and possible colicystitis and will have a surgical consult.  He is not appropriate for a TCU with the dialysis need but arrangements have been made to transfer to [facility] when the times comes.  Described client as in a \"holding pattern\"..      5/23/17 Telephonic care conference.  Client is refusing dialysis, thinking it is a transfusion and it is all done.  Will do more  Cognitive testing to see if Client can make his own decisions.  At this time he is focused on going home.  CM can go to apartment and get more PCA than the 4 hours he is currently approved for but he will not qualify for 24 hour care.  Two nieces Jhon and Rito both are limited in what they can provide for PCA so the homecare agency would need to provide staff.  Client's creatinin is climbing in spite of dialysis and stopping is recommended by nephrologist.  Client states that he knows that he is going home to die.  Palliative care would need to make the hospice recommendation.    Client has a young wife and son in [place] and he is very focused that he fly to [facility] to see them before he dies.     Tammy the DANIEL will call after the cognitive testing is complete.      Amanda Coates RN  [facility]   [phone]  "

## 2017-05-30 NOTE — TELEPHONE ENCOUNTER
omeprazole (PRILOSEC) 40 MG capsule      Last Written Prescription Date: 7/17/16  Last Fill Quantity: 90,  # refills: 3   Last Office Visit with [facility], [facility] or [facility] prescribing provider: 3/21/17                                         Next 5 appointments (look out 90 days)     Jun 05, 2017 11:20 AM CDT   Office [facility] with Mil[facility]ler [facility]   [facility]    [address] [place] [id number] [phone]

## 2017-06-03 NOTE — TELEPHONE ENCOUNTER
Routing refill request to provider for review/approval because:  Associated dx- ignacio Schneider, Clinical RN Maria Luisa Barrios.

## 2017-06-05 NOTE — PROGRESS NOTES
Call from DANIEL at [facility] stating that client was discharged 17 to his home at his request.  He has refused dialysis.  Then DANIEL said client was leaving the country to go to [facility] the next day.  TASHA has tried calling client's niece who is also his PCA, several times with no answer so vm left.  Client had said that he wanted to go to [facility] to see his wife and son before he .      Amanda Coates RN  [facility]   [phone]

## 2017-06-06 NOTE — PROGRESS NOTES
Client was discharged from [facility] on 5/29/17.  He went home and then 5/31/17 left the country for at least a month according to tony Ferreira.  He has chosen to not go ahead with dialysis since when they did it for a week at [facility], it didn't help.  Jhon said they bought a service package for client so he has someone meet him with a wheelchair and get him on the plane.  Jhon said he will be in [place] for at least a month.  Said he will lose his apartment in two more since he has been gone already for over a month.  Jhon agreed to call this CM when she knew anything more.  Will close EW and all services since gone for over a month.    Amanda Coates RN  [facility]   [phone]

## 2017-06-06 NOTE — PROGRESS NOTES
Per CM, placed monthly order with Paulina on hold.  Therea emailed.  Phoebe Hale  Case Management Specialist  [facility]  [phone]

## 2017-06-06 NOTE — PROGRESS NOTES
Received a request to submit a DTR for the termination of skilled nurse visits, lifeline and elderly waiver. Documentation completed and faxed to the health plan.  aware.    Judith Butcher RN  Utilization   [facility]  [phone]

## 2017-06-07 NOTE — PROGRESS NOTES
"Per CM, mailed client a \"Refusal of Home Visit\" letter.  MMIS entry.  Phoebe Hale  Case Management Specialist  [facility]  [phone]          "

## 2017-06-23 NOTE — PROGRESS NOTES
"Callto client's niece \"Jhon\"  To get update on how client is doing.  He left the country the day after he got home from the hospital to be with his wife and son in [place].  Per Jhon, client is doing well and pain is manageable and he is not ready to come home.        Amanda Coates RN  [facility]   [phone]  "

## 2017-06-27 NOTE — TELEPHONE ENCOUNTER
metoprolol (LOPRESSOR) 50 MG tablet      Last Written Prescription Date: 10/13/16  Last Fill Quantity: 90, # refills: 3    Last Office Visit with G, P or [facility] prescribing provider:  03/21/17 Dr. Adler   Future Office Visit:        BP Readings from Last 3 Encounters:   03/21/17 136/72   12/27/16 126/82   11/11/16 124/72

## 2017-07-03 NOTE — TELEPHONE ENCOUNTER
The following prescription was sent to [facility]:    metoprolol (LOPRESSOR) 50 MG tablet 90 tablet 3 10/13/2016  No   Sig: TAKE 1/2 TABLET BY MOUTH TWICE DAILY.   Class: E-Prescribe   Order: [id number]   E-Prescribing Status: Receipt confirmed by pharmacy (10/13/2016  8:22 AM CDT)     Request denied.  Too soon to fill.    Анна López RN

## 2017-07-18 ENCOUNTER — CARE COORDINATION (OUTPATIENT)
Dept: GERIATRIC MEDICINE | Facility: CLINIC | Age: 82
End: 2017-07-18

## 2017-07-18 NOTE — PROGRESS NOTES
"Call from client's niece \"Angel Luis\" who reports that client  while in [place].  Said that he  in his sleep.      Amanda Coates RN  [facility]   [phone]  "

## 2021-05-31 ENCOUNTER — RECORDS - HEALTHEAST (OUTPATIENT)
Dept: ADMINISTRATIVE | Facility: CLINIC | Age: 86
End: 2021-05-31

## 2022-05-04 NOTE — TELEPHONE ENCOUNTER
[facility]/IPR REFERRAL CONTACT NOTE  [id number] [place] [address] for Physical Rehabilitation    RE:  Juan Mckeon     Thank you for the opportunity to review this patient's case for admission to [address] [place] [address] for Physical Rehabilitation. Scarlett ] This patient does not meet criteria for admission to [facility] for Physical Rehabilitation:    Scarlett ] Documents do not reflect active medical necessity requiring close Physician involvement and Rehabilitation Nursing. Thank you for this referral.   Please do not hesitate to call if you need further information or have additional questions.      Regards,    Josr Rodriguez PTA   [facility]missions [facility] for Physical Rehabilitation  [phone] Form faxed and placed in scan jodi Snell MA